# Patient Record
Sex: MALE | Race: OTHER | Employment: STUDENT | URBAN - METROPOLITAN AREA
[De-identification: names, ages, dates, MRNs, and addresses within clinical notes are randomized per-mention and may not be internally consistent; named-entity substitution may affect disease eponyms.]

---

## 2024-08-20 ENCOUNTER — OFFICE VISIT (OUTPATIENT)
Dept: FAMILY MEDICINE CLINIC | Facility: CLINIC | Age: 29
End: 2024-08-20
Payer: COMMERCIAL

## 2024-08-20 VITALS
TEMPERATURE: 98 F | DIASTOLIC BLOOD PRESSURE: 62 MMHG | WEIGHT: 194 LBS | OXYGEN SATURATION: 100 % | SYSTOLIC BLOOD PRESSURE: 118 MMHG | RESPIRATION RATE: 14 BRPM | BODY MASS INDEX: 30.09 KG/M2 | HEIGHT: 67.5 IN | HEART RATE: 87 BPM

## 2024-08-20 DIAGNOSIS — U07.1 COVID-19 VIRUS INFECTION: Primary | ICD-10-CM

## 2024-08-20 DIAGNOSIS — Z11.52 ENCOUNTER FOR SCREENING LABORATORY TESTING FOR COVID-19 VIRUS: ICD-10-CM

## 2024-08-20 LAB
OPERATOR ID: ABNORMAL
POCT LOT NUMBER: ABNORMAL
RAPID SARS-COV-2 BY PCR: DETECTED

## 2024-08-20 PROCEDURE — U0002 COVID-19 LAB TEST NON-CDC: HCPCS | Performed by: FAMILY MEDICINE

## 2024-08-20 PROCEDURE — 99203 OFFICE O/P NEW LOW 30 MIN: CPT | Performed by: FAMILY MEDICINE

## 2024-08-20 NOTE — PROGRESS NOTES
Awais Bahena is a 29 year old female.    S:  Patient presents today with the following concerns:  Chief Complaint   Patient presents with    Covid   Took at home covid test and came back positive yesterday.  Had had covid in the past.  Symptoms started yesterday.    Dry mouth, slight cough, fevers last night, slight nasal congestion.  No sore throat, N/V/D.    No sick contacts.    Works at a [employer] plant.  Does need a note.  Ibuprofen last night.       No current outpatient medications on file.     Patient Active Problem List   Diagnosis    Closed fracture of metatarsal bone(s)     No family history on file.    REVIEW OF SYSTEMS:  GENERAL: feels well otherwise  SKIN: denies any unusual skin lesions  EYES:denies vision change  LUNGS: denies shortness of breath with exertion  CARDIOVASCULAR: denies chest pain on exertion  GI: denies abdominal pain.  No N/V/D/C  : denies dysuria  MUSCULOSKELETAL: denies back pain  NEURO: headaches    EXAM:  /62   Pulse 87   Temp 98.1 °F (36.7 °C)   Resp 14   Ht 5' 7.5\" (1.715 m)   Wt 194 lb (88 kg)   SpO2 100%   BMI 29.94 kg/m²   Physical Exam  Constitutional:       General: He is not in acute distress.     Appearance: Normal appearance. He is not ill-appearing, toxic-appearing or diaphoretic.   HENT:      Head: Normocephalic and atraumatic.      Right Ear: Tympanic membrane and ear canal normal.      Left Ear: Tympanic membrane and ear canal normal.      Mouth/Throat:      Mouth: Mucous membranes are moist.      Pharynx: Oropharynx is clear.   Eyes:      Extraocular Movements: Extraocular movements intact.      Conjunctiva/sclera: Conjunctivae normal.      Pupils: Pupils are equal, round, and reactive to light.   Cardiovascular:      Rate and Rhythm: Regular rhythm.      Heart sounds: Normal heart sounds.   Pulmonary:      Effort: Pulmonary effort is normal.      Breath sounds: Normal breath sounds.   Musculoskeletal:      Cervical back: Neck supple. No rigidity  or tenderness.   Lymphadenopathy:      Cervical: No cervical adenopathy.   Skin:     General: Skin is warm and dry.      Findings: No rash.   Neurological:      General: No focal deficit present.      Mental Status: He is alert and oriented to person, place, and time.   Psychiatric:         Mood and Affect: Mood normal.         Behavior: Behavior normal.      Rapid Covid test is Positive.    ASSESSMENT AND PLAN:  Awais Bahena is a 29 year old female.  Encounter Diagnoses   Name Primary?    COVID-19 virus infection Yes    Encounter for screening laboratory testing for COVID-19 virus        No results found.     Orders Placed This Encounter   Procedures    COVID-19 LAB TEST NON-CDC     Meds & Refills for this Visit:  Requested Prescriptions      No prescriptions requested or ordered in this encounter     Imaging & Consults:  None  Patient is recommended to stay home until he is fever free/feeling better for 24 hours or per his employer's protocol for Covid.    Recommend fluids, rest.  OTC meds prn.    Go to ED with dyspnea or chest pain.      Follow up if symptoms change, worsen, do not improve.    Patient verbalizes understanding of plan.  No follow-ups on file.

## (undated) NOTE — LETTER
Date: 8/20/2024    Patient Name: Awais Bahena          To Whom it may concern:    The above patient was seen at [facility] for treatment of a medical condition.    This patient should be excused from attending work due to Covid 19 infection.  He was seen in our clinic today and is under our care.        Sincerely,      Alyssa Merritt PA-C

## (undated) NOTE — LETTER
Date: 8/20/2024    Patient Name: Awais Bahena          To Whom it may concern:    This letter has been written at the patient's request. The above patient was seen at [facility] for treatment of a medical condition.    This patient should be excused from attending work through 8/25/24 due to Covid infection.  He was seen in our clinic and is under our care.          Sincerely,      Alyssa Merritt PA-C